# Patient Record
Sex: FEMALE | Race: BLACK OR AFRICAN AMERICAN | NOT HISPANIC OR LATINO | Employment: FULL TIME | URBAN - METROPOLITAN AREA
[De-identification: names, ages, dates, MRNs, and addresses within clinical notes are randomized per-mention and may not be internally consistent; named-entity substitution may affect disease eponyms.]

---

## 2024-04-27 ENCOUNTER — HOSPITAL ENCOUNTER (EMERGENCY)
Facility: HOSPITAL | Age: 24
Discharge: HOME | End: 2024-04-27
Attending: EMERGENCY MEDICINE
Payer: COMMERCIAL

## 2024-04-27 VITALS
TEMPERATURE: 99 F | HEART RATE: 84 BPM | DIASTOLIC BLOOD PRESSURE: 99 MMHG | HEIGHT: 63 IN | OXYGEN SATURATION: 100 % | WEIGHT: 263 LBS | SYSTOLIC BLOOD PRESSURE: 146 MMHG | RESPIRATION RATE: 18 BRPM | BODY MASS INDEX: 46.6 KG/M2

## 2024-04-27 DIAGNOSIS — R10.2 PELVIC PAIN IN PREGNANCY: ICD-10-CM

## 2024-04-27 DIAGNOSIS — O20.0 THREATENED MISCARRIAGE: Primary | ICD-10-CM

## 2024-04-27 DIAGNOSIS — O26.899 PELVIC PAIN IN PREGNANCY: ICD-10-CM

## 2024-04-27 LAB
ABO + RH BLD: NORMAL
ALBUMIN SERPL BCP-MCNC: 4.3 G/DL (ref 3.5–5.2)
ALP SERPL-CCNC: 42 U/L (ref 55–135)
ALT SERPL W/O P-5'-P-CCNC: 14 U/L (ref 10–44)
ANION GAP SERPL CALC-SCNC: 8 MMOL/L (ref 8–16)
AST SERPL-CCNC: 14 U/L (ref 10–40)
B-HCG UR QL: POSITIVE
BACTERIA #/AREA URNS HPF: ABNORMAL /HPF
BASOPHILS # BLD AUTO: 0.04 K/UL (ref 0–0.2)
BASOPHILS NFR BLD: 0.6 % (ref 0–1.9)
BILIRUB SERPL-MCNC: 0.5 MG/DL (ref 0.1–1)
BILIRUB UR QL STRIP: NEGATIVE
BUN SERPL-MCNC: 6 MG/DL (ref 6–20)
CALCIUM SERPL-MCNC: 9.4 MG/DL (ref 8.7–10.5)
CHLORIDE SERPL-SCNC: 104 MMOL/L (ref 95–110)
CLARITY UR: ABNORMAL
CO2 SERPL-SCNC: 26 MMOL/L (ref 23–29)
COLOR UR: YELLOW
CREAT SERPL-MCNC: 0.7 MG/DL (ref 0.5–1.4)
CTP QC/QA: YES
DIFFERENTIAL METHOD BLD: ABNORMAL
EOSINOPHIL # BLD AUTO: 0.1 K/UL (ref 0–0.5)
EOSINOPHIL NFR BLD: 0.9 % (ref 0–8)
ERYTHROCYTE [DISTWIDTH] IN BLOOD BY AUTOMATED COUNT: 15.2 % (ref 11.5–14.5)
EST. GFR  (NO RACE VARIABLE): >60 ML/MIN/1.73 M^2
GLUCOSE SERPL-MCNC: 109 MG/DL (ref 70–110)
GLUCOSE UR QL STRIP: NEGATIVE
HCG INTACT+B SERPL-ACNC: 861.98 MIU/ML
HCT VFR BLD AUTO: 35.9 % (ref 37–48.5)
HGB BLD-MCNC: 10.8 G/DL (ref 12–16)
HGB UR QL STRIP: ABNORMAL
HYALINE CASTS #/AREA URNS LPF: 0 /LPF
IMM GRANULOCYTES # BLD AUTO: 0.02 K/UL (ref 0–0.04)
IMM GRANULOCYTES NFR BLD AUTO: 0.3 % (ref 0–0.5)
KETONES UR QL STRIP: NEGATIVE
LEUKOCYTE ESTERASE UR QL STRIP: NEGATIVE
LYMPHOCYTES # BLD AUTO: 2.2 K/UL (ref 1–4.8)
LYMPHOCYTES NFR BLD: 32 % (ref 18–48)
MCH RBC QN AUTO: 22.6 PG (ref 27–31)
MCHC RBC AUTO-ENTMCNC: 30.1 G/DL (ref 32–36)
MCV RBC AUTO: 75 FL (ref 82–98)
MICROSCOPIC COMMENT: ABNORMAL
MONOCYTES # BLD AUTO: 0.5 K/UL (ref 0.3–1)
MONOCYTES NFR BLD: 7.2 % (ref 4–15)
NEUTROPHILS # BLD AUTO: 4 K/UL (ref 1.8–7.7)
NEUTROPHILS NFR BLD: 59 % (ref 38–73)
NITRITE UR QL STRIP: NEGATIVE
NRBC BLD-RTO: 0 /100 WBC
PH UR STRIP: 6 [PH] (ref 5–8)
PLATELET # BLD AUTO: 439 K/UL (ref 150–450)
PMV BLD AUTO: 9.5 FL (ref 9.2–12.9)
POTASSIUM SERPL-SCNC: 3.8 MMOL/L (ref 3.5–5.1)
PROT SERPL-MCNC: 7.8 G/DL (ref 6–8.4)
PROT UR QL STRIP: ABNORMAL
RBC # BLD AUTO: 4.77 M/UL (ref 4–5.4)
RBC #/AREA URNS HPF: 26 /HPF (ref 0–4)
SODIUM SERPL-SCNC: 138 MMOL/L (ref 136–145)
SP GR UR STRIP: 1.03 (ref 1–1.03)
SQUAMOUS #/AREA URNS HPF: 9 /HPF
URN SPEC COLLECT METH UR: ABNORMAL
UROBILINOGEN UR STRIP-ACNC: NEGATIVE EU/DL
WBC # BLD AUTO: 6.84 K/UL (ref 3.9–12.7)
WBC #/AREA URNS HPF: 12 /HPF (ref 0–5)

## 2024-04-27 PROCEDURE — 81025 URINE PREGNANCY TEST: CPT | Performed by: NURSE PRACTITIONER

## 2024-04-27 PROCEDURE — 86901 BLOOD TYPING SEROLOGIC RH(D): CPT | Performed by: PHYSICIAN ASSISTANT

## 2024-04-27 PROCEDURE — 87086 URINE CULTURE/COLONY COUNT: CPT | Performed by: NURSE PRACTITIONER

## 2024-04-27 PROCEDURE — 81001 URINALYSIS AUTO W/SCOPE: CPT | Performed by: NURSE PRACTITIONER

## 2024-04-27 PROCEDURE — 85025 COMPLETE CBC W/AUTO DIFF WBC: CPT | Performed by: PHYSICIAN ASSISTANT

## 2024-04-27 PROCEDURE — 99284 EMERGENCY DEPT VISIT MOD MDM: CPT | Mod: 25

## 2024-04-27 PROCEDURE — 84702 CHORIONIC GONADOTROPIN TEST: CPT | Performed by: PHYSICIAN ASSISTANT

## 2024-04-27 PROCEDURE — 80053 COMPREHEN METABOLIC PANEL: CPT | Performed by: PHYSICIAN ASSISTANT

## 2024-04-27 NOTE — ED PROVIDER NOTES
"Encounter Date: 4/27/2024       History     Chief Complaint   Patient presents with    PREGNANCY PROBLEM     JUST FOUND OUT SHE WAS PREGNANT TUESDAY     Vaginal Bleeding     SPOTTING     Presents to the emergency room after going to urgent care on Tuesday and having a positive pregnancy test.  She reports that on the 14th she started what she described as spotting.  Friday she noticed blood clots.  She reported mild cramping at that time.  This has her 1st pregnancy.  On Tuesday after going to urgent care and having a positive pregnancy test she was seen at the emergency room and weekends.  At that time her beta quant was 6 118.93.  She returned to the same hospital on the 25th.  A repeat beta quant was 588.49.  At that time she reports that they wanted to give her "a pill to terminate the pregnancy".  They were concerned she might have a tubal pregnancy.  Her mother refused to allow that.  She wanted her daughter to have a 2nd opinion.  She is here today for the 2nd opinion.  She continues to have vaginal bleeding.  She reports mild cramping pain on the left side.  She denies fever nausea vomiting or diarrhea.  Patient was unsure as to her last menstrual period as she reports "I have irregular periods" she denies urinary symptoms.      Review of patient's allergies indicates:  No Known Allergies  Past Medical History:   Diagnosis Date    Anemia, unspecified     Asthma     Irregular menstruation, unspecified      No past surgical history on file.  No family history on file.     Review of Systems   Respiratory:  Negative for cough and shortness of breath.    Cardiovascular:  Negative for chest pain and leg swelling.   Gastrointestinal:  Negative for abdominal distention, abdominal pain, constipation, diarrhea, nausea and vomiting.        Mild cramping to the left side of abdomen.   Genitourinary:  Positive for vaginal bleeding. Negative for difficulty urinating, dysuria, flank pain, frequency, hematuria, vaginal " discharge and vaginal pain.       Physical Exam     Initial Vitals [04/27/24 1241]   BP Pulse Resp Temp SpO2   (!) 146/99 84 18 98.7 °F (37.1 °C) 100 %      MAP       --         Physical Exam    Constitutional: She appears well-developed and well-nourished. No distress.   HENT:   Head: Normocephalic.   Mouth/Throat: Oropharynx is clear and moist.   Eyes: Conjunctivae are normal.   Neck: Neck supple.   Normal range of motion.  Cardiovascular:  Normal rate and regular rhythm.           Pulmonary/Chest: No respiratory distress. She has no wheezes. She has no rhonchi.   Abdominal: Abdomen is soft. Bowel sounds are normal. She exhibits no distension. There is no abdominal tenderness. There is no guarding.   Genitourinary:    No vaginal discharge.      Genitourinary Comments: Os is closed.  There is mild vaginal bleeding.  No clots noted.  Negative adnexal tenderness.  Negative for cervical motion tenderness.     Musculoskeletal:      Cervical back: Normal range of motion and neck supple.     Neurological: No sensory deficit. GCS score is 15. GCS eye subscore is 4. GCS verbal subscore is 5. GCS motor subscore is 6.         ED Course   Procedures  Labs Reviewed   COMPREHENSIVE METABOLIC PANEL - Abnormal; Notable for the following components:       Result Value    Alkaline Phosphatase 42 (*)     All other components within normal limits    Narrative:     Release to patient->Immediate   CBC W/ AUTO DIFFERENTIAL - Abnormal; Notable for the following components:    Hemoglobin 10.8 (*)     Hematocrit 35.9 (*)     MCV 75 (*)     MCH 22.6 (*)     MCHC 30.1 (*)     RDW 15.2 (*)     All other components within normal limits    Narrative:     Release to patient->Immediate   URINALYSIS, REFLEX TO URINE CULTURE - Abnormal; Notable for the following components:    Appearance, UA Hazy (*)     Protein, UA 1+ (*)     Occult Blood UA 3+ (*)     All other components within normal limits    Narrative:     Specimen Source->Urine   URINALYSIS  MICROSCOPIC - Abnormal; Notable for the following components:    RBC, UA 26 (*)     WBC, UA 12 (*)     All other components within normal limits    Narrative:     Specimen Source->Urine   POCT URINE PREGNANCY - Abnormal; Notable for the following components:    POC Preg Test, Ur Positive (*)     All other components within normal limits   CULTURE, URINE   HCG, QUANTITATIVE    Narrative:     Release to patient->Immediate   GROUP & RH          Imaging Results              US OB <14 Wks, TransAbd, Single Gestation (Final result)  Result time 04/27/24 14:28:16      Final result by Nirav Soto MD (04/27/24 14:28:16)                   Impression:      Tiny intrauterine fluid collection is indeterminate, but could potentially represent a very early gestational sac.  Correlation with quantitative beta HCG, last menstrual period, and short interval follow-up sonography is recommended.    Hypoechoic material within the endocervical canal, likely blood.      Electronically signed by: Nirav Soto  Date:    04/27/2024  Time:    14:28               Narrative:    EXAMINATION:  US OB <14 WEEKS TRANSABDOM, SINGLE GESTATION    CLINICAL HISTORY:  Other specified pregnancy related conditions, unspecified trimester    TECHNIQUE:  Grayscale and color Doppler evaluation of the pelvis was performed    COMPARISON:  None    FINDINGS:  The uterus measures 8.6 x 3.7 x 5.7 cm. Tiny intrauterine fluid collection with mean diameter of 2 mm was documented. No identifiable yolk sac or fetal pole.  Endometrial thickness of 7 mm.  No myometrial mass.  Hypoechoic material is present within the endocervical canal.    Right ovary measures 2.6 x 1.6 x 3.0 cm. Left ovary measures 2.9 x 2.6 by 2.4 cm. Peripheral ovarian follicles bilaterally, in this patient with provided history of PCOS. Appropriate Doppler flow to right and left ovaries was documented.    No free fluid in the cul-de-sac.                                       Medications - No  data to display  Medical Decision Making  Amount and/or Complexity of Data Reviewed  Labs: ordered.                                      Clinical Impression:  Final diagnoses:  [O26.899, R10.2] Pelvic pain in pregnancy  [O20.0] Threatened miscarriage (Primary)          ED Disposition Condition    Discharge Stable          ED Prescriptions    None       Follow-up Information       Follow up With Specialties Details Why Contact Info    Sharyn Real MD Obstetrics and Gynecology In 2 days  [address] [facility] [address]  [phone]               Kenna Astorga, NP  04/27/24 6094

## 2024-04-27 NOTE — FIRST PROVIDER EVALUATION
Emergency Department TeleTriage Encounter Note      CHIEF COMPLAINT    Chief Complaint   Patient presents with    PREGNANCY PROBLEM     JUST FOUND OUT SHE WAS PREGNANT TUESDAY     Vaginal Bleeding     SPOTTING       VITAL SIGNS   Initial Vitals [24 1241]   BP Pulse Resp Temp SpO2   (!) 146/99 84 18 98.7 °F (37.1 °C) 100 %      MAP       --            ALLERGIES    Review of patient's allergies indicates:  No Known Allergies    PROVIDER TRIAGE NOTE  Patient  presenting with vaginal spotting and pain in the left pelvic area. She was seen at another hospital and has had serial ultrasound and bhcg and was still told to follow up in 2 days.       ORDERS  Labs Reviewed - No data to display    ED Orders (720h ago, onward)      None              Virtual Visit Note: The provider triage portion of this emergency department evaluation and documentation was performed via [employer], a HIPAA-compliant telemedicine application, in concert with a tele-presenter in the room. A face to face patient evaluation with one of my colleagues will occur once the patient is placed in an emergency department room.      DISCLAIMER: This note was prepared with [employer] voice recognition transcription software. Garbled syntax, mangled pronouns, and other bizarre constructions may be attributed to that software system.

## 2024-04-27 NOTE — DISCHARGE INSTRUCTIONS
Return to the ED for any worsening of symptoms or any other concerns.  Please follow up here on Monday or with Dr. Real on Monday for a follow up repeat beta-hCG.  You may come to the emergency room.  Asked for Bonny.  Dr. Real as and Aroldo if you prefer to go to see her on Monday that would work also.  Return immediately for any worsening of symptoms or any other concerns.

## 2024-04-29 ENCOUNTER — NURSE TRIAGE (OUTPATIENT)
Dept: ADMINISTRATIVE | Facility: CLINIC | Age: 24
End: 2024-04-29

## 2024-04-29 ENCOUNTER — TELEPHONE (OUTPATIENT)
Dept: OBSTETRICS AND GYNECOLOGY | Facility: CLINIC | Age: 24
End: 2024-04-29
Payer: COMMERCIAL

## 2024-04-29 ENCOUNTER — HOSPITAL ENCOUNTER (EMERGENCY)
Facility: HOSPITAL | Age: 24
Discharge: LEFT BEFORE DISCHARGE | End: 2024-04-29
Attending: EMERGENCY MEDICINE
Payer: COMMERCIAL

## 2024-04-29 VITALS
OXYGEN SATURATION: 97 % | DIASTOLIC BLOOD PRESSURE: 94 MMHG | TEMPERATURE: 99 F | HEART RATE: 87 BPM | SYSTOLIC BLOOD PRESSURE: 174 MMHG | RESPIRATION RATE: 14 BRPM

## 2024-04-29 DIAGNOSIS — Z53.21 ELOPED FROM EMERGENCY DEPARTMENT: Primary | ICD-10-CM

## 2024-04-29 LAB
BACTERIA UR CULT: NORMAL
BACTERIA UR CULT: NORMAL
BASOPHILS # BLD AUTO: 0.04 K/UL (ref 0–0.2)
BASOPHILS NFR BLD: 0.6 % (ref 0–1.9)
DIFFERENTIAL METHOD BLD: ABNORMAL
EOSINOPHIL # BLD AUTO: 0.1 K/UL (ref 0–0.5)
EOSINOPHIL NFR BLD: 0.7 % (ref 0–8)
ERYTHROCYTE [DISTWIDTH] IN BLOOD BY AUTOMATED COUNT: 15.1 % (ref 11.5–14.5)
HCG INTACT+B SERPL-ACNC: 762.75 MIU/ML
HCT VFR BLD AUTO: 34.6 % (ref 37–48.5)
HGB BLD-MCNC: 10.6 G/DL (ref 12–16)
IMM GRANULOCYTES # BLD AUTO: 0.03 K/UL (ref 0–0.04)
IMM GRANULOCYTES NFR BLD AUTO: 0.4 % (ref 0–0.5)
LYMPHOCYTES # BLD AUTO: 1.8 K/UL (ref 1–4.8)
LYMPHOCYTES NFR BLD: 24.8 % (ref 18–48)
MCH RBC QN AUTO: 22.8 PG (ref 27–31)
MCHC RBC AUTO-ENTMCNC: 30.6 G/DL (ref 32–36)
MCV RBC AUTO: 75 FL (ref 82–98)
MONOCYTES # BLD AUTO: 0.6 K/UL (ref 0.3–1)
MONOCYTES NFR BLD: 7.6 % (ref 4–15)
NEUTROPHILS # BLD AUTO: 4.8 K/UL (ref 1.8–7.7)
NEUTROPHILS NFR BLD: 65.9 % (ref 38–73)
NRBC BLD-RTO: 0 /100 WBC
PLATELET # BLD AUTO: 423 K/UL (ref 150–450)
PMV BLD AUTO: 9.6 FL (ref 9.2–12.9)
RBC # BLD AUTO: 4.64 M/UL (ref 4–5.4)
WBC # BLD AUTO: 7.21 K/UL (ref 3.9–12.7)

## 2024-04-29 PROCEDURE — 84702 CHORIONIC GONADOTROPIN TEST: CPT | Performed by: EMERGENCY MEDICINE

## 2024-04-29 PROCEDURE — 99283 EMERGENCY DEPT VISIT LOW MDM: CPT

## 2024-04-29 PROCEDURE — 85025 COMPLETE CBC W/AUTO DIFF WBC: CPT | Performed by: EMERGENCY MEDICINE

## 2024-04-29 NOTE — TELEPHONE ENCOUNTER
Pt calling and asking about the results of her BHCG and they wanted to recheck her levels today and to see if she is possible having a miscarriage. Pt went and had the labs drawn and then left and now calling to see what it means . Pt told that I can't alk to her about results and she can go back to the ED and discuss with a provider but in her chart it said to follow up with Dr anthony today with ob gyn. Pt told unfortunately she will need to discuss with MD and I did reach out to HAMILTON Astorga that's she saw in the ED to see if she can call with directions. Pt should have stayed in the ED for results. No [facility] PCP                    Reason for Disposition   Requesting lab results and adult stable (no new symptoms, not worsening)    Protocols used: Information Only Call - No Triage-A-OH

## 2024-04-29 NOTE — ED PROVIDER NOTES
Encounter Date: 4/29/2024       History     Chief Complaint   Patient presents with    Labs Only     States she was sent for HCG level check     24-year-old female presents emergency department no significant past medical history reports last menstrual cycle March 2, 2024.  Patient is G1 , reports had bleeding and was seen here 2 days ago she states bleeding has since stopped to the point that she is only spotting she denies any abdominal pain or any complaints she states she was told to come here to have her blood tested.        Review of patient's allergies indicates:  No Known Allergies  Past Medical History:   Diagnosis Date    Anemia, unspecified     Asthma     Irregular menstruation, unspecified      No past surgical history on file.  No family history on file.     Review of Systems   Constitutional: Negative.    Respiratory: Negative.     Genitourinary:  Positive for vaginal bleeding.   Musculoskeletal: Negative.    Neurological: Negative.    Psychiatric/Behavioral: Negative.     All other systems reviewed and are negative.      Physical Exam     Initial Vitals [04/29/24 1034]   BP Pulse Resp Temp SpO2   (!) 174/94 87 14 98.5 °F (36.9 °C) 97 %      MAP       --         Physical Exam    Constitutional: She appears well-developed and well-nourished.   Cardiovascular:  Normal rate.             Psychiatric: She has a normal mood and affect.         ED Course   Procedures  Labs Reviewed   CBC W/ AUTO DIFFERENTIAL - Abnormal; Notable for the following components:       Result Value    Hemoglobin 10.6 (*)     Hematocrit 34.6 (*)     MCV 75 (*)     MCH 22.8 (*)     MCHC 30.6 (*)     RDW 15.1 (*)     All other components within normal limits    Narrative:     Release to patient->Immediate   HCG, QUANTITATIVE    Narrative:     Release to patient->Immediate          Imaging Results    None          Medications - No data to display  Medical Decision Making  24-year-old female presents emergency department no significant  past medical history reports last menstrual cycle March 2, 2024.  Patient is G1 , reports had bleeding and was seen here 2 days ago she states bleeding has since stopped to the point that she is only spotting she denies any abdominal pain or any complaints she states she was told to come here to have her blood tested.      Patient had a 1st provider evaluation in triage room she was not completely examined she had no complete physical exam again this was only a 1st provider evaluation that was completed labs were ordered I attempted to call the patient back into her room in order to examine her as there is a bed available in a triage room however the navigator instructed me that the patient eloped the emergency department before any complete physical exam could be performed or any further ER workup..    Amount and/or Complexity of Data Reviewed  Labs: ordered.                                      Clinical Impression:  Final diagnoses:  [Z53.21] Eloped from emergency department (Primary)          ED Disposition Condition    Eloped Stable                Ramonita Hatch, CARLIN  04/29/24 4013

## 2024-04-29 NOTE — TELEPHONE ENCOUNTER
Contacted pt and she states she was seen in two different ER's and the bleeding has stopped and would like to schedule an appt with Dr Real. Advised Dr Real is in [place] and she stated she was told she was in [place]. Pt continued to scheduled appt. Offered appt with Dr Pichardo to get something sooner and she stated she will wait to see Dr Real. Scheduled soonest available appt and placed her on the waitlist. Also advised to go to the ER if she experienced heavy bleeding or severe abdominal pain. Pt voiced understanding.

## 2024-04-29 NOTE — TELEPHONE ENCOUNTER
----- Message from Krista Kennedy sent at 4/29/2024  1:20 PM CDT -----  Type: Needs Medical Advice  Who Called:  patient  Best Call Back Number: [phone] (home)   Additional Information: patient calling to speak to nurse- patient was seen in hospital. Was told that she needed to see dr anthony today to determine if she was having a miscarriage or ectopic pregnancy. Please advise.

## 2024-05-13 ENCOUNTER — OFFICE VISIT (OUTPATIENT)
Dept: OBSTETRICS AND GYNECOLOGY | Facility: CLINIC | Age: 24
End: 2024-05-13
Payer: COMMERCIAL

## 2024-05-13 ENCOUNTER — LAB VISIT (OUTPATIENT)
Dept: LAB | Facility: HOSPITAL | Age: 24
End: 2024-05-13
Attending: GENERAL PRACTICE
Payer: COMMERCIAL

## 2024-05-13 ENCOUNTER — TELEPHONE (OUTPATIENT)
Dept: OBSTETRICS AND GYNECOLOGY | Facility: CLINIC | Age: 24
End: 2024-05-13

## 2024-05-13 VITALS
WEIGHT: 266 LBS | HEIGHT: 63 IN | BODY MASS INDEX: 47.13 KG/M2 | SYSTOLIC BLOOD PRESSURE: 120 MMHG | DIASTOLIC BLOOD PRESSURE: 70 MMHG

## 2024-05-13 DIAGNOSIS — O20.0 THREATENED MISCARRIAGE: Primary | ICD-10-CM

## 2024-05-13 DIAGNOSIS — O20.0 THREATENED MISCARRIAGE: ICD-10-CM

## 2024-05-13 LAB — HCG INTACT+B SERPL-ACNC: 367 MIU/ML

## 2024-05-13 PROCEDURE — 36415 COLL VENOUS BLD VENIPUNCTURE: CPT | Mod: PO | Performed by: GENERAL PRACTICE

## 2024-05-13 PROCEDURE — 3074F SYST BP LT 130 MM HG: CPT | Mod: CPTII,S$GLB,, | Performed by: GENERAL PRACTICE

## 2024-05-13 PROCEDURE — 84702 CHORIONIC GONADOTROPIN TEST: CPT | Performed by: GENERAL PRACTICE

## 2024-05-13 PROCEDURE — 99203 OFFICE O/P NEW LOW 30 MIN: CPT | Mod: S$GLB,,, | Performed by: GENERAL PRACTICE

## 2024-05-13 PROCEDURE — 99999 PR PBB SHADOW E&M-EST. PATIENT-LVL II: CPT | Mod: PBBFAC,,, | Performed by: GENERAL PRACTICE

## 2024-05-13 PROCEDURE — 3008F BODY MASS INDEX DOCD: CPT | Mod: CPTII,S$GLB,, | Performed by: GENERAL PRACTICE

## 2024-05-13 PROCEDURE — 3078F DIAST BP <80 MM HG: CPT | Mod: CPTII,S$GLB,, | Performed by: GENERAL PRACTICE

## 2024-05-13 PROCEDURE — 1159F MED LIST DOCD IN RCRD: CPT | Mod: CPTII,S$GLB,, | Performed by: GENERAL PRACTICE

## 2024-05-13 NOTE — PROGRESS NOTES
Sw[facility] SALINAS  Patient Name: Josesito Miner  MRN: [id number]    TODAY'S PROGRESS NOTE    SUBJECTIVE   05/13/2024  Josesito Miner is a 24 y.o. here with her BF for ER follow-up for a possible miscarriage.  LMP 02 MAR --> EDC 07 DEC / would be 10+2wks today    Today having some right sided low back pain.  Bleeding yesterday.  Has been on/off.    Went to ER initially for bleeding.  Was advised they were worried about an ectopic pregnancy and so returned in 2 days.    Was an unplanned but welcome pregnancy.      OBJECTIVE   Vitals:    05/13/24 1100   BP: 120/70       GEN = alert/oriented, nad, pleasant, Yoan present and supportive  HEENT = sclera anicteric, EOM grossly normal   = deferred, no acute concerns    LABS & RADS     HCG (29 APR 2024) = 762  HCG (27 APR 2024) = 862     29 APR   WBC 7   HGB 10.6   HCT 34.6         MBT = B POS    PELVIC US (27 APR 2024) =  Uterus 9 x 4 x 6 cm  EMS 7 mm with a tiny intrauterine fluid collection with 2mm diameter, no yolk sac or fetal pole, hypoechoic material is within the endocervical canal  ROV 3 x 2 x 3 cm  LOV 3 x 3 x 2 cm  Other: no free fluid in cds         ASSESSMENT / PLAN   24 y.o. with a likely miscarriage.  Low suspicion for ectopic based on clinical status.    labs: HCG quant today --> f/u will be determined by result     Reassured patient that SAB's are unfortunately common, no work-up for a cause is indicated at this time based on her circumstances, and it's very unlikely that she did (or didn't do) anything that caused the miscarriage.    Advised to be on a PNV if TTC    next cervical CA screen due age 25    Encouraged weight loss prior to next conception; discussed multiple potential pregnancy complications related to obesity    Abstinence while bleeding; condoms till next period; then can TTC    RTC prn    MD KELLY   OB PROBLEM LIST:    Asthma  Obesity, BMI 47  Threatened AB     FINAL EDC:    FOB Name: Yoan ANATOMY SCAN:      INITIAL  LABS:    MBT: B positive       10-12 WEEK LABS:    PAP: PAP neg per patient / Date: age 22   28 WEEK LABS:         OTHER LABS:     OTHER ULTRASOUNDS:     TO-DO THROUGHOUT PREGNANCY:       MEDICATIONS:    None ALLERGIES:    NKDA    MEDICAL HISTORY:    Asthma, exercise-induced  Pre-pregnancy BMI = 47 SURGICAL HISTORY:    None    SOCIAL HISTORY:    Smoker: non-smoker  Alcohol: yes but not since +HCG  Drugs: denies  Relationship: monogamous relationship with FOB  Domestic Violence: no  Lives with: boyfriend Yoan  Education Level: High School  Occupation:  CNA  Jewish: none  Other:   GYN HISTORY:    PAP'S: no h/o abnormals  STI'S: no past history  GENITAL HSV: no FAMILY HISTORY:    HTN: No  DIABETES: No  BLEEDING D/O: No  CLOTTING D/O: No  BIRTH DEFECTS: No  MENTAL DISABILITY: No  GYN CANCER: No  Other:     OBSTETRIC HISTORY   Month/Year Mode of Delivery EGA Wt. M/F Complications / Comments

## 2024-05-14 DIAGNOSIS — O20.0 THREATENED MISCARRIAGE: Primary | ICD-10-CM

## 2024-05-14 NOTE — PROGRESS NOTES
Symphony,    The pregnancy hormone is definitely dropping, but it's high enough that I think we should keep an eye on things.  Please go to the lab next week Monday, May 20th for another check.  We'll stay in touch.    Sincerely,  Dr. Real

## 2024-05-20 ENCOUNTER — LAB VISIT (OUTPATIENT)
Dept: LAB | Facility: HOSPITAL | Age: 24
End: 2024-05-20
Attending: GENERAL PRACTICE
Payer: COMMERCIAL

## 2024-05-20 DIAGNOSIS — O20.0 THREATENED MISCARRIAGE: ICD-10-CM

## 2024-05-20 LAB — HCG INTACT+B SERPL-ACNC: 151 MIU/ML

## 2024-05-20 PROCEDURE — 36415 COLL VENOUS BLD VENIPUNCTURE: CPT | Mod: PO | Performed by: GENERAL PRACTICE

## 2024-05-20 PROCEDURE — 84702 CHORIONIC GONADOTROPIN TEST: CPT | Performed by: GENERAL PRACTICE

## 2024-05-21 DIAGNOSIS — O20.0 THREATENED MISCARRIAGE: Primary | ICD-10-CM

## 2024-05-22 NOTE — PROGRESS NOTES
Symphony,    Let's keep trending it down.  I've ordered another test for May 27.    Sincerely,  Dr. Real